# Patient Record
(demographics unavailable — no encounter records)

---

## 2025-03-27 NOTE — HISTORY OF PRESENT ILLNESS
[FreeTextEntry1] : 63M with dilated aortic root, borderline HLD who presents for follow up  BP has been running higher as of late at urgent care (had URI), and then subsequently at PCP office Feels well without CP, dyspnea Pt notes sporadic episodes where he starts to feel very lightheaded, almost feels as if he is going to pass out but then he recovers, usually relieved with a cough or a belch. No clear triggers or patterns to these episodes. Occurs once monthly. Attributes to GERD Trying to start walking again, less active over the summer   Known dilated aortic root, 4.3cm on last measurement  Never smoker. Denies family hx of CAD. Consulting- helps turn companies around  ECG: SR, no ST-T wave changes CT: CAC score 2. 4.3 cm aortic root, 4.1cm ascending aorta TTE 7/2024:  1. Left ventricular cavity is normal in size. Left ventricular wall thickness is normal. Left ventricular systolic function is normal with an ejection fraction of 59 % by Brooks's method of disks. There are no regional wall motion abnormalities seen. 2. There is mild (grade 1) left ventricular diastolic dysfunction, with normal left ventricular filling pressure. 3. Normal right ventricular cavity size, with normal wall thickness, and normal right ventricular systolic function. Tricuspid annular plane systolic excursion (TAPSE) is 3.1 cm (normal >=1.7 cm). 4. The left atrium is normal in size. 5. Probably trileaflet aortic valve with normal systolic excursion. Fibrocalcific aortic valve sclerosis without stenosis. Moderate aortic regurgitation.

## 2025-03-27 NOTE — PHYSICAL EXAM

## 2025-03-27 NOTE — DISCUSSION/SUMMARY
[FreeTextEntry1] : Dilated aortic root, HLD, intermittent lightheadedness, AI, minimal coronary calcification. Elevated BP readings at home   BP normal on recheck. Keep home log, watch salt, increase exercise. Can hold on meds. Strict BP control given known AI   Unclear etiology of episodic lightheadedness.  Patient thinks it may be associated with reflux.  Would recommend to keep a log of symptoms, when they occur, what he is doing at the time of symptoms, dietary intake that day, etc.  Minimal coronary calcification in the borderline lipids.  Continue to encourage aggressive lifestyle changes, weight loss, continued exercise  O2 sat initially low 90s, up to 98% on repeat. Consider pulmonary eval   RV 6M

## 2025-04-02 NOTE — IMAGING
[de-identified] : RIGHT KNEE Inspection:  mild effusion Palpation: medial joint line tenderness, anterior tenderness Knee Range of Motion:  3-125  Strength: 5/5 Quadriceps strength, 5/5 Hamstring strength Neurological: light touch is intact throughout Ligament Stability and Special Tests:  McMurrays: neg Lachman: neg Pivot Shift: neg Posterior Drawer: neg Valgus: neg Varus: neg Patella Apprehension: neg Patella Maltracking: neg    LEFT KNEE Inspection:  mild effusion Palpation: medial joint line tenderness, anterior tenderness Knee Range of Motion:  3-125  Strength: 5/5 Quadriceps strength, 5/5 Hamstring strength Neurological: light touch is intact throughout Ligament Stability and Special Tests:  McMurrays: neg Lachman: neg Pivot Shift: neg Posterior Drawer: neg Valgus: neg Varus: neg Patella Apprehension: neg Patella Maltracking: neg

## 2025-04-02 NOTE — HISTORY OF PRESENT ILLNESS
[de-identified] : 63 year old male  (RHD, semi-retired consults ) chronic debo knees L>R worsening pain since 10/2023  The pain is located anterior, medial  The pain is associated with  catching, clicking, swelling  Worse with activity and better at rest. Has tried ice, anti-inflammatory, CSI, gel shots  H/O left knee sx x2 arthroscopic with dr. ibrahim years ago apporx 1990s  11/20/23- here with continued b/l knee pain, CSI (11/6/23) with temp relief, would like to discuss poss visco inj  11/27/23- b/l knee euflexxa #2  12/4/23- b/l knee euflexxa #3  4/2/25 - was doing well from visco for year or so , worsening pain since 2025, now Left alot worse then right knee with buckling

## 2025-04-02 NOTE — ASSESSMENT
[FreeTextEntry1] : Bilateral X-Ray Examination of the KNEE (4 views): mod medial and patellofemoral degenerate changes.  exacerbation of underlying arthritis   - We discussed their diagnosis and treatment options at length including the risks and benefits of both surgical treatment with a knee replacement and non-surgical options. Surgical risks include but are not limited to pain, infection, bleeding, vascular injury, numbness, tingling, nerve damage. - Due to risks of surgery, they will continue conservative treatment with PT, icing, and anti-inflammatory medications - The patient was provided with a PT prescription to work on ROM, hip ER/abductors strengthening, quad/hamstring stretches and strengthening, and other exercises - The patient was advised to let pain guide the gradual advancement of activities. - We also discussed the possible of a corticosteroid injection in order to help decrease inflammation and pain so that they can perform better therapy. - The risks, benefits, and alternatives to corticosteroid injection were reviewed with the patient and they wished to proceed with this treatment course. - L knee CSI given, lamine well - Follow up as needed in 6 weeks to re-evaluate, if no improvement we spoke about possibility of viscosupplementation injections    Medication Discussion: 1) We discussed a comprehensive treatment plan that included possible pharmaceutical management involving the use of prescription strength medications including but not limited to options such as oral Naprosyn 500mg BID, once daily Meloxicam 15 mg, or 500-650 mg Tylenol versus over the counter oral medications in addition to discussing possible topical prescription Pennsaid vs  Voltaren gel. 2) There is a moderate risk of morbidity with further treatment, especially from use of prescription strength medications and possible side effects of these medications which include but are not limited to upset stomach with oral medications, skin reactions to topical medications and GI/cardiac/renal issues with long term use. 3) I recommended that the patient follow-up with their medical physician if there are any significant potential issues with long term medication use such as interactions with current medications or with exacerbation of underlying medical comorbidities. 4) The benefits and risks associated with use of oral and / or topical prescription and over the counter anti-inflammatory medications were discussed with the patient. The patient voiced understanding of the risks including but not limited to bleeding, stroke, kidney dysfunction, heart disease, and were referred to the black box warning label for further information.

## 2025-04-09 NOTE — HISTORY OF PRESENT ILLNESS
[de-identified] : 63 year old male  (RHD, semi-retired consults ) chronic debo knees L>R worsening pain since 10/2023  The pain is located anterior, medial  The pain is associated with  catching, clicking, swelling  Worse with activity and better at rest. Has tried ice, anti-inflammatory, CSI, gel shots  H/O left knee sx x2 arthroscopic with dr. ibrahim years ago apporx 1990s  11/20/23- here with continued b/l knee pain, CSI (11/6/23) with temp relief, would like to discuss poss visco inj  11/27/23- b/l knee euflexxa #2  12/4/23- b/l knee euflexxa #3  4/2/25 - was doing well from visco for year or so , worsening pain since 2025, now Left alot worse then right knee with buckling 4/9/25 - had L knee CSI (4/2), continue to have knee pain

## 2025-04-09 NOTE — IMAGING
[de-identified] : RIGHT KNEE Inspection:  mild effusion Palpation: medial joint line tenderness, anterior tenderness Knee Range of Motion:  3-125  Strength: 5/5 Quadriceps strength, 5/5 Hamstring strength Neurological: light touch is intact throughout Ligament Stability and Special Tests:  McMurrays: neg Lachman: neg Pivot Shift: neg Posterior Drawer: neg Valgus: neg Varus: neg Patella Apprehension: neg Patella Maltracking: neg    LEFT KNEE Inspection:  mild effusion Palpation: medial joint line tenderness, anterior tenderness Knee Range of Motion:  3-125  Strength: 5/5 Quadriceps strength, 5/5 Hamstring strength Neurological: light touch is intact throughout Ligament Stability and Special Tests:  McMurrays: neg Lachman: neg Pivot Shift: neg Posterior Drawer: neg Valgus: neg Varus: neg Patella Apprehension: neg Patella Maltracking: neg

## 2025-04-17 NOTE — HISTORY OF PRESENT ILLNESS
[de-identified] : 63 year old male  (RHD, semi-retired consults ) chronic debo knees L>R worsening pain since 10/2023  The pain is located anterior, medial  The pain is associated with  catching, clicking, swelling  Worse with activity and better at rest. Has tried ice, anti-inflammatory, CSI, gel shots  H/O left knee sx x2 arthroscopic with dr. ibrahim years ago apporx 1990s  11/20/23- here with continued b/l knee pain, CSI (11/6/23) with temp relief, would like to discuss poss visco inj  11/27/23- b/l knee euflexxa #2  12/4/23- b/l knee euflexxa #3  4/2/25 - was doing well from visco for year or so , worsening pain since 2025, now Left alot worse then right knee with buckling 4/9/25 - had L knee CSI (4/2), continue to have knee pain 4/17/25 - here for B/L knee Euflexxa #2

## 2025-04-17 NOTE — IMAGING
[de-identified] : RIGHT KNEE Inspection:  mild effusion Palpation: medial joint line tenderness, anterior tenderness Knee Range of Motion:  3-125  Strength: 5/5 Quadriceps strength, 5/5 Hamstring strength Neurological: light touch is intact throughout Ligament Stability and Special Tests:  McMurrays: neg Lachman: neg Pivot Shift: neg Posterior Drawer: neg Valgus: neg Varus: neg Patella Apprehension: neg Patella Maltracking: neg    LEFT KNEE Inspection:  mild effusion Palpation: medial joint line tenderness, anterior tenderness Knee Range of Motion:  3-125  Strength: 5/5 Quadriceps strength, 5/5 Hamstring strength Neurological: light touch is intact throughout Ligament Stability and Special Tests:  McMurrays: neg Lachman: neg Pivot Shift: neg Posterior Drawer: neg Valgus: neg Varus: neg Patella Apprehension: neg Patella Maltracking: neg

## 2025-04-17 NOTE — HISTORY OF PRESENT ILLNESS
[de-identified] : 63 year old male  (RHD, semi-retired consults ) chronic debo knees L>R worsening pain since 10/2023  The pain is located anterior, medial  The pain is associated with  catching, clicking, swelling  Worse with activity and better at rest. Has tried ice, anti-inflammatory, CSI, gel shots  H/O left knee sx x2 arthroscopic with dr. ibrahim years ago apporx 1990s  11/20/23- here with continued b/l knee pain, CSI (11/6/23) with temp relief, would like to discuss poss visco inj  11/27/23- b/l knee euflexxa #2  12/4/23- b/l knee euflexxa #3  4/2/25 - was doing well from visco for year or so , worsening pain since 2025, now Left alot worse then right knee with buckling 4/9/25 - had L knee CSI (4/2), continue to have knee pain 4/17/25 - here for B/L knee Euflexxa #2

## 2025-04-17 NOTE — ASSESSMENT
[FreeTextEntry1] : mod medial and patellofemoral degenerate changes.  exacerbation of underlying arthritis   - We discussed their diagnosis and treatment options at length including the risks and benefits of both surgical treatment with a knee replacement and non-surgical options. Surgical risks include but are not limited to pain, infection, bleeding, vascular injury, numbness, tingling, nerve damage. - Due to risks of surgery, they will continue conservative treatment with PT, icing, and anti-inflammatory medications - The patient was provided with a PT prescription to work on ROM, hip ER/abductors strengthening, quad/hamstring stretches and strengthening, and other exercises - The patient was advised to let pain guide the gradual advancement of activities. - we spoke about possibility of viscosupplementation injections and they want to proceed - B/L knee Euflexxa #2 given, lamine well

## 2025-04-17 NOTE — IMAGING
[de-identified] : RIGHT KNEE Inspection:  mild effusion Palpation: medial joint line tenderness, anterior tenderness Knee Range of Motion:  3-125  Strength: 5/5 Quadriceps strength, 5/5 Hamstring strength Neurological: light touch is intact throughout Ligament Stability and Special Tests:  McMurrays: neg Lachman: neg Pivot Shift: neg Posterior Drawer: neg Valgus: neg Varus: neg Patella Apprehension: neg Patella Maltracking: neg    LEFT KNEE Inspection:  mild effusion Palpation: medial joint line tenderness, anterior tenderness Knee Range of Motion:  3-125  Strength: 5/5 Quadriceps strength, 5/5 Hamstring strength Neurological: light touch is intact throughout Ligament Stability and Special Tests:  McMurrays: neg Lachman: neg Pivot Shift: neg Posterior Drawer: neg Valgus: neg Varus: neg Patella Apprehension: neg Patella Maltracking: neg

## 2025-04-23 NOTE — IMAGING
[de-identified] : RIGHT KNEE Inspection:  mild effusion Palpation: medial joint line tenderness, anterior tenderness Knee Range of Motion:  3-125  Strength: 5/5 Quadriceps strength, 5/5 Hamstring strength Neurological: light touch is intact throughout Ligament Stability and Special Tests:  McMurrays: neg Lachman: neg Pivot Shift: neg Posterior Drawer: neg Valgus: neg Varus: neg Patella Apprehension: neg Patella Maltracking: neg    LEFT KNEE Inspection:  mild effusion Palpation: medial joint line tenderness, anterior tenderness Knee Range of Motion:  3-125  Strength: 5/5 Quadriceps strength, 5/5 Hamstring strength Neurological: light touch is intact throughout Ligament Stability and Special Tests:  McMurrays: neg Lachman: neg Pivot Shift: neg Posterior Drawer: neg Valgus: neg Varus: neg Patella Apprehension: neg Patella Maltracking: neg

## 2025-04-23 NOTE — IMAGING
[de-identified] : RIGHT KNEE Inspection:  mild effusion Palpation: medial joint line tenderness, anterior tenderness Knee Range of Motion:  3-125  Strength: 5/5 Quadriceps strength, 5/5 Hamstring strength Neurological: light touch is intact throughout Ligament Stability and Special Tests:  McMurrays: neg Lachman: neg Pivot Shift: neg Posterior Drawer: neg Valgus: neg Varus: neg Patella Apprehension: neg Patella Maltracking: neg    LEFT KNEE Inspection:  mild effusion Palpation: medial joint line tenderness, anterior tenderness Knee Range of Motion:  3-125  Strength: 5/5 Quadriceps strength, 5/5 Hamstring strength Neurological: light touch is intact throughout Ligament Stability and Special Tests:  McMurrays: neg Lachman: neg Pivot Shift: neg Posterior Drawer: neg Valgus: neg Varus: neg Patella Apprehension: neg Patella Maltracking: neg

## 2025-04-23 NOTE — ASSESSMENT
[FreeTextEntry1] : mod medial and patellofemoral degenerate changes.  exacerbation of underlying arthritis   - We discussed their diagnosis and treatment options at length including the risks and benefits of both surgical treatment with a knee replacement and non-surgical options. Surgical risks include but are not limited to pain, infection, bleeding, vascular injury, numbness, tingling, nerve damage. - Due to risks of surgery, they will continue conservative treatment with PT, icing, and anti-inflammatory medications - The patient was provided with a PT prescription to work on ROM, hip ER/abductors strengthening, quad/hamstring stretches and strengthening, and other exercises - The patient was advised to let pain guide the gradual advancement of activities. - we spoke about possibility of viscosupplementation injections and they want to proceed - B/L knee Euflexxa #3 given, lamine well

## 2025-04-23 NOTE — HISTORY OF PRESENT ILLNESS
[de-identified] : 63 year old male  (RHD, semi-retired consults ) chronic debo knees L>R worsening pain since 10/2023  The pain is located anterior, medial  The pain is associated with  catching, clicking, swelling  Worse with activity and better at rest. Has tried ice, anti-inflammatory, CSI, gel shots  H/O left knee sx x2 arthroscopic with dr. ibrahim years ago apporx 1990s  11/20/23- here with continued b/l knee pain, CSI (11/6/23) with temp relief, would like to discuss poss visco inj  11/27/23- b/l knee euflexxa #2  12/4/23- b/l knee euflexxa #3  4/2/25 - was doing well from visco for year or so , worsening pain since 2025, now Left alot worse then right knee with buckling 4/9/25 - had L knee CSI (4/2), continue to have knee pain 4/17/25 - here for B/L knee Euflexxa #2 4/23/25 - here for B/L knee Euflexxa #3

## 2025-04-23 NOTE — HISTORY OF PRESENT ILLNESS
[de-identified] : 63 year old male  (RHD, semi-retired consults ) chronic debo knees L>R worsening pain since 10/2023  The pain is located anterior, medial  The pain is associated with  catching, clicking, swelling  Worse with activity and better at rest. Has tried ice, anti-inflammatory, CSI, gel shots  H/O left knee sx x2 arthroscopic with dr. ibrahim years ago apporx 1990s  11/20/23- here with continued b/l knee pain, CSI (11/6/23) with temp relief, would like to discuss poss visco inj  11/27/23- b/l knee euflexxa #2  12/4/23- b/l knee euflexxa #3  4/2/25 - was doing well from visco for year or so , worsening pain since 2025, now Left alot worse then right knee with buckling 4/9/25 - had L knee CSI (4/2), continue to have knee pain 4/17/25 - here for B/L knee Euflexxa #2 4/23/25 - here for B/L knee Euflexxa #3

## 2025-04-28 NOTE — ASSESSMENT
Regarding: PASTORA LAYTON,69 ,back pain, pain level 7 can't sit down, RX refill, patient is out of [place]  ----- Message from Luisa GORMAN sent at 4/28/2025 12:45 PM CDT -----  Patient Name: Zana Davis    Specialist or PCP Name: Hiren KRUGER, Marlin GRANADOS    Symptoms: back pain, pain level 7 can't sit down, RX refill, patient is out of [place]     Pregnant (females aged 13-60. If Yes, how long?) : n/a    Call Back # : [phone]    Which State are you currently located in?: [place]    Name of Clinic Site / Acct# : [facility] [address]     Call arrived during: Work Hours     [FreeTextEntry1] : mod medial and patellofemoral degenerate changes.  exacerbation of underlying arthritis   - We discussed their diagnosis and treatment options at length including the risks and benefits of both surgical treatment with a knee replacement and non-surgical options. Surgical risks include but are not limited to pain, infection, bleeding, vascular injury, numbness, tingling, nerve damage. - Due to risks of surgery, they will continue conservative treatment with PT, icing, and anti-inflammatory medications - The patient was provided with a PT prescription to work on ROM, hip ER/abductors strengthening, quad/hamstring stretches and strengthening, and other exercises - The patient was advised to let pain guide the gradual advancement of activities. - we spoke about possibility of viscosupplementation injections and they want to proceed - B/L knee Euflexxa #1 given, lamine well    Medication Discussion: 1) We discussed a comprehensive treatment plan that included possible pharmaceutical management involving the use of prescription strength medications including but not limited to options such as oral Naprosyn 500mg BID, once daily Meloxicam 15 mg, or 500-650 mg Tylenol versus over the counter oral medications in addition to discussing possible topical prescription Pennsaid vs  Voltaren gel. 2) There is a moderate risk of morbidity with further treatment, especially from use of prescription strength medications and possible side effects of these medications which include but are not limited to upset stomach with oral medications, skin reactions to topical medications and GI/cardiac/renal issues with long term use. 3) I recommended that the patient follow-up with their medical physician if there are any significant potential issues with long term medication use such as interactions with current medications or with exacerbation of underlying medical comorbidities. 4) The benefits and risks associated with use of oral and / or topical prescription and over the counter anti-inflammatory medications were discussed with the patient. The patient voiced understanding of the risks including but not limited to bleeding, stroke, kidney dysfunction, heart disease, and were referred to the black box warning label for further information.

## 2025-05-12 NOTE — ASSESSMENT
[FreeTextEntry1] : xrays mod medial and patellofemoral degenerate changes. mri left knee 5/7/25 - tricomp deg, worst PF and medially, MMT, LM deg, MFC edema with ealry insuff fx, eff, pop cyst  exacerbation of underlying b/l arthritis and left MFC edema c/w early insuff fx   - We discussed their diagnosis and treatment options at length including the risks and benefits of both surgical treatment with a knee replacement and non-surgical options. Surgical risks include but are not limited to pain, infection, bleeding, vascular injury, numbness, tingling, nerve damage. - Due to risks of surgery involved with knee replacement, they will continue conservative treatment with PT, icing, and anti-inflammatory medications - The patient was provided with a PT prescription to work on ROM, hip ER/abductors strengthening, quad/hamstring stretches and strengthening, and other exercises - The patient was advised to modify their activities - nothing high impact - The patient was advised to let pain guide the gradual advancement of activities.    Medication Discussion: 1) We discussed a comprehensive treatment plan that included possible pharmaceutical management involving the use of prescription strength medications including but not limited to options such as oral Naprosyn 500mg BID, once daily Meloxicam 15 mg, or 500-650 mg Tylenol versus over the counter oral medications in addition to discussing possible topical prescription Pennsaid vs  Voltaren gel. 2) There is a moderate risk of morbidity with further treatment, especially from use of prescription strength medications and possible side effects of these medications which include but are not limited to upset stomach with oral medications, skin reactions to topical medications and GI/cardiac/renal issues with long term use. 3) I recommended that the patient follow-up with their medical physician if there are any significant potential issues with long term medication use such as interactions with current medications or with exacerbation of underlying medical comorbidities. 4) The benefits and risks associated with use of oral and / or topical prescription and over the counter anti-inflammatory medications were discussed with the patient. The patient voiced understanding of the risks including but not limited to bleeding, stroke, kidney dysfunction, heart disease, and were referred to the black box warning label for further information.

## 2025-05-12 NOTE — HISTORY OF PRESENT ILLNESS
[de-identified] : 63 year old male  (RHD, semi-retired consults ) chronic debo knees L>R worsening pain since 10/2023  The pain is located anterior, medial  The pain is associated with  catching, clicking, swelling  Worse with activity and better at rest. Has tried ice, anti-inflammatory, CSI, gel shots  H/O left knee sx x2 arthroscopic with dr. ibrahim years ago apporx 1990s  11/20/23- here with continued b/l knee pain, CSI (11/6/23) with temp relief, would like to discuss poss visco inj  11/27/23- b/l knee euflexxa #2  12/4/23- b/l knee euflexxa #3  4/2/25 - was doing well from visco for year or so , worsening pain since 2025, now Left alot worse then right knee with buckling 4/9/25 - had L knee CSI (4/2), continue to have knee pain 4/17/25 - here for B/L knee Euflexxa #2 4/23/25 - here for B/L knee Euflexxa #3 5/12/25 - right kknee some relief visco, left knee cont pain and had mri

## 2025-05-12 NOTE — IMAGING
[de-identified] : RIGHT KNEE Inspection:  mild effusion Palpation: medial joint line tenderness, anterior tenderness Knee Range of Motion:  3-125  Strength: 5/5 Quadriceps strength, 5/5 Hamstring strength Neurological: light touch is intact throughout Ligament Stability and Special Tests:  McMurrays: neg Lachman: neg Pivot Shift: neg Posterior Drawer: neg Valgus: neg Varus: neg Patella Apprehension: neg Patella Maltracking: neg    LEFT KNEE Inspection:  mild effusion Palpation: medial joint line tenderness, anterior tenderness Knee Range of Motion:  3-125  Strength: 5/5 Quadriceps strength, 5/5 Hamstring strength Neurological: light touch is intact throughout Ligament Stability and Special Tests:  McMurrays: neg Lachman: neg Pivot Shift: neg Posterior Drawer: neg Valgus: neg Varus: neg Patella Apprehension: neg Patella Maltracking: neg

## 2025-06-06 NOTE — IMAGING
[Left] : left knee [de-identified] : LEFT KNEE Mild Effusion + Medial joint line tenderness (-) Lateral joint line tenderness + PF tenderness + Crepitus  ROM 0-115 5/5 Strength NVI  Mildly Antalgic gait w/o assistance  [FreeTextEntry9] : Moderate OA KL 2

## 2025-06-06 NOTE — HISTORY OF PRESENT ILLNESS
[de-identified] : 6/6/25: 64yo M with longstanding left knee pain that has been worsening over the past few months with no injury. Hx of L knee scope ~40 years ago and ~15 years ago. Admits to increasing pain and difficulty with prolonged walking/standing, startup, and stairs. He has tried multiple modalities of conservative treatment with Dr. Butterfield including anti-inflammatories, PT/HEP, CSI, and gel injections that are no longer providing sufficient relief. [FreeTextEntry5] : debo knee pain for couple months, Np injury. Has seen davi April 2025, stated oa and Menius tear. Has tried ha/csi with temp relief.  h/o wil 40 years ago and 12 years ago

## 2025-06-06 NOTE — DISCUSSION/SUMMARY
[de-identified] : The patient was advised of the diagnosis.  The natural history of the pathology was explained in full to the patient in layman's terms. All questions were answered.  The risks and benefits of surgical and non-surgical treatment alternatives were explained in full to the patient.  The natural progression of Osteoarthritis was explained to the patient.  We discussed the possible treatment options from conservative to operative.  These included NSAIDS, Glucosamine and Chondroitin sulfate, and Physical Therapy as well different types of injections.  We also discussed that at some point they may progress to needing a TKA.  Information and pamphlets were given when appropriate.   Patient Complains of pain in Knee with a level that often reaches greater than a 8/10. The Pain has been progressively worsening of his/her treatment course. The pain has interfered with their ADLs and worsens with weight bearing. On exam they often have episodes of swelling/effusion with limited ROM. Pain worsens with ROM passive and active and I can palpate crepitus.   X-rays were reviewed with the patient, and they show joint space narrowing, subchondral sclerosis, osteophyte formation, and subchondral cysts.   After a period of more than 12 weeks physical therapy or exercise program done with me or another treating physician, they have continued pain. The patient has failed a trial of NSAID medication or pain relievers if they were unable to tolerate NSAID medications as well as a series of injection, steroid or Hyaluronic Acid. After a long discussion with the patient both the patient and I have decided we have exhausted all forms of less radical treatments, and they would like to proceed with Total Knee Replacement  Patient has not had and/or will have any IA knee injection within 12 weeks prior to surgery.   We discussed my findings and the natural history of their condition.  We talked about the details of the proposed surgery and the recovery.  We discussed the material risks, possible benefits and alternatives to surgery.  The risks include but are not limited to infection, bleeding and possible need for blood transfusion, fracture, bowel blockage, bladder retention or infection, need for reoperation, stiffness and/or limited range of motion, possible damage to nerves and blood vessels, failure of fixation of components, risk of deep vein thromboses and pulmonary embolism, wound healing problems, dislocation, and possible leg length discrepancy.  Although incredibly rare, we also discussed the risks of a cardiac event, stroke and even death during, or following, the surgery.  We discussed the type of implants the patient will be receiving and the type of fixation that will be used, as well as whether a robot or computer navigation aide will be used.  The patient understands they will need medical clearance and will attend a preoperative joint education class.  We also discussed the type of anesthesia they will receive, and the risks associated with hospital or rehab length of stay, obesity, diabetes and smoking.  Progress Note completed by Grecia Canada PA-C The documentation recorded in this note accurately reflects the decisions made by me during this visit.  I interviewed and examined the patient personally and oversaw all medical decisions. - Dr. Estevez

## 2025-06-06 NOTE — ASSESSMENT
[FreeTextEntry1] : 6/6/25: Mod/adv L knee OA. Pain is affecting his daily activities. Discussed anti-inflammatories, PT/HEP, CSI, visco injections, and TKA. He has tried multiple modalities of conservative treatment including anti-inflammatories, PT/HEP, CSI, and gel injections that are no longer providing sufficient relief. Discussed TKA, r/b/a, and preop/postop periods in detail. He is well informed and would like to proceed with L TKA STEPHANY. Will obtain CT Left Knee to eval for bone loss and for presurgical eval.

## 2025-07-28 NOTE — HISTORY OF PRESENT ILLNESS
[FreeTextEntry1] : 63M with dilated aortic root, borderline HLD, PVC's  Pending L TKR 8/6 Feeling well overall without complaints Patient denies chest pain, shortness of breath, palpitations, dizziness, lightheadedness, syncope. Not exercising as much due to knee pain PVC's on ECG today No further lightheadedness episodes  HISTORY:   Pt notes sporadic episodes where he starts to feel very lightheaded, almost feels as if he is going to pass out but then he recovers, usually relieved with a cough or a belch. No clear triggers or patterns to these episodes. Occurs once monthly. Attributes to GERD  Known dilated aortic root, 4.3cm on last measurement  Never smoker. Denies family hx of CAD. Consulting- helps turn companies around  ECG: SR, PVC's CT: CAC score 2. 4.3 cm aortic root, 4.1cm ascending aorta TTE 7/2024:  1. Left ventricular cavity is normal in size. Left ventricular wall thickness is normal. Left ventricular systolic function is normal with an ejection fraction of 59 % by Brooks's method of disks. There are no regional wall motion abnormalities seen. 2. There is mild (grade 1) left ventricular diastolic dysfunction, with normal left ventricular filling pressure. 3. Normal right ventricular cavity size, with normal wall thickness, and normal right ventricular systolic function. Tricuspid annular plane systolic excursion (TAPSE) is 3.1 cm (normal >=1.7 cm). 4. The left atrium is normal in size. 5. Probably trileaflet aortic valve with normal systolic excursion. Fibrocalcific aortic valve sclerosis without stenosis. Moderate aortic regurgitation.

## 2025-07-28 NOTE — DISCUSSION/SUMMARY
[EKG obtained to assist in diagnosis and management of assessed problem(s)] : EKG obtained to assist in diagnosis and management of assessed problem(s) [FreeTextEntry1] : Dilated aortic root, HLD, intermittent lightheadedness, AI, minimal coronary calcification  Pending L TKR BP good today  No further lightheadedness- unclear etiology. May be associated with GERD episodes  Mod AI with dilated root: BP control, serial TTE  Minimal coronary calcification in the borderline lipids.  Continue to encourage aggressive lifestyle changes, weight loss, continued exercise  PVC's on ECG today, also from PST. No symptoms. Prior cardiac testing WNL. Continue to monitor. Consider ZIO in the future    RV 6M   Pending L TKR. This patient is able to perform >4 METS of activity without limitation. No evidence of ongoing ischemia, arrhythmia, valvular heart disease or decompensated heart failure.  This patient is optimized from a cardiac standpoint for this intermediate risk surgery.  No further cardiac testing is necessary prior to surgery.